# Patient Record
Sex: MALE | Race: WHITE | HISPANIC OR LATINO | Employment: UNEMPLOYED | URBAN - METROPOLITAN AREA
[De-identification: names, ages, dates, MRNs, and addresses within clinical notes are randomized per-mention and may not be internally consistent; named-entity substitution may affect disease eponyms.]

---

## 2021-07-27 ENCOUNTER — OFFICE VISIT (OUTPATIENT)
Dept: URGENT CARE | Facility: PHYSICIAN GROUP | Age: 51
End: 2021-07-27

## 2021-07-27 VITALS
SYSTOLIC BLOOD PRESSURE: 110 MMHG | BODY MASS INDEX: 23.37 KG/M2 | RESPIRATION RATE: 12 BRPM | TEMPERATURE: 99 F | OXYGEN SATURATION: 94 % | WEIGHT: 163.2 LBS | DIASTOLIC BLOOD PRESSURE: 68 MMHG | HEIGHT: 70 IN | HEART RATE: 87 BPM

## 2021-07-27 DIAGNOSIS — H53.9 VISION CHANGES: ICD-10-CM

## 2021-07-27 DIAGNOSIS — H10.11 ACUTE ATOPIC CONJUNCTIVITIS OF RIGHT EYE: ICD-10-CM

## 2021-07-27 DIAGNOSIS — H53.141 PHOTOPHOBIA OF RIGHT EYE: ICD-10-CM

## 2021-07-27 DIAGNOSIS — H57.89 RED EYE: ICD-10-CM

## 2021-07-27 PROCEDURE — 99204 OFFICE O/P NEW MOD 45 MIN: CPT | Performed by: NURSE PRACTITIONER

## 2021-07-27 RX ORDER — POLYMYXIN B SULFATE AND TRIMETHOPRIM 1; 10000 MG/ML; [USP'U]/ML
1 SOLUTION OPHTHALMIC EVERY 4 HOURS
Qty: 10 ML | Refills: 0 | Status: SHIPPED | OUTPATIENT
Start: 2021-07-27

## 2021-07-27 ASSESSMENT — ENCOUNTER SYMPTOMS
EYE REDNESS: 1
PHOTOPHOBIA: 1
EYE ITCHING: 1
BLURRED VISION: 1

## 2021-07-28 NOTE — PROGRESS NOTES
"Subjective:   Sundar Schulz is a 51 y.o. male who presents for Eye Problem (R eye redness, painful, blurry vision, light sensitivity, x3 days )       Eye Problem   The right eye is affected. This is a new problem. The current episode started in the past 7 days (4 days). The problem occurs constantly. The problem has been unchanged. There was no injury mechanism. The pain is moderate. There is no known exposure to pink eye. He does not wear contacts. Associated symptoms include blurred vision, eye redness, itching and photophobia.     Pt presents for evaluation of a new problem, reports a 4 to 6-week history of right eye irritation, and changes in vision.  Patient states with past 3 days, his right eye has become more red, dry, and feeling irritated.  Patient is also noticed increased photophobia.  Patient denies trauma, injury, or history of in vision deficits.    This visit was completed with the assistance of a .      Review of Systems   Constitutional: Negative.    HENT: Negative.    Eyes: Positive for blurred vision, photophobia, redness and itching.   Respiratory: Negative.    Cardiovascular: Negative.    Gastrointestinal: Negative.    Genitourinary: Negative.    Musculoskeletal: Negative.    Skin: Negative.    Neurological: Negative.    Psychiatric/Behavioral: Negative.    All other systems reviewed and are negative.      MEDS: No current outpatient medications on file.  ALLERGIES: No Known Allergies    Patient's PMH, SocHx, SurgHx, FamHx, Drug allergies and medications were reviewed.     Objective:   /68 (BP Location: Right arm, Patient Position: Sitting, BP Cuff Size: Adult)   Pulse 87   Temp 37.2 °C (99 °F) (Temporal)   Resp 12   Ht 1.78 m (5' 10.08\")   Wt 74 kg (163 lb 3.2 oz)   SpO2 94%   BMI 23.36 kg/m²     Physical Exam  Vitals and nursing note reviewed.   Constitutional:       General: He is awake.      Appearance: Normal appearance. He is well-developed.   HENT:      " Head: Normocephalic and atraumatic.      Right Ear: External ear normal.      Left Ear: External ear normal.      Nose: Nose normal.      Mouth/Throat:      Lips: Pink.      Mouth: Mucous membranes are moist.      Pharynx: Oropharynx is clear.   Eyes:      General: Lids are normal.      Extraocular Movements: Extraocular movements intact.      Conjunctiva/sclera:      Right eye: Right conjunctiva is injected. No exudate.     Pupils: Pupils are equal, round, and reactive to light.      Slit lamp exam:     Right eye: Photophobia present.      Comments: Right eye 20/200  Left eye 20/30   Cardiovascular:      Rate and Rhythm: Normal rate and regular rhythm.   Pulmonary:      Effort: Pulmonary effort is normal.   Musculoskeletal:         General: Normal range of motion.      Cervical back: Normal range of motion.   Skin:     General: Skin is warm and dry.   Neurological:      Mental Status: He is alert and oriented to person, place, and time.   Psychiatric:         Mood and Affect: Mood normal.         Behavior: Behavior normal. Behavior is cooperative.         Thought Content: Thought content normal.         Judgment: Judgment normal.         Assessment/Plan:   Assessment    1. Acute atopic conjunctivitis of right eye  - polymixin-trimethoprim (POLYTRIM) 55680-6.1 UNIT/ML-% Solution; Administer 1 Drop into both eyes every 4 hours.  Dispense: 10 mL; Refill: 0  - REFERRAL TO OPHTHALMOLOGY    2. Vision changes  - REFERRAL TO OPHTHALMOLOGY    3. Red eye  - REFERRAL TO OPHTHALMOLOGY    4. Photophobia of right eye  - REFERRAL TO OPHTHALMOLOGY    Vital signs stable at today's acute urgent care visit.  Begin medications as listed. Discussed my concerns regarding his significant right eye vision loss over the past 1.5 months.  Patient prefers to not go to ED at this time, but will follow up with optometry tomorrow morning. Discussed management options (risks, benefits, and alternatives to treatment).     Advised the patient to  follow-up with the primary care provider for recheck, reevaluation, and/or consideration of further management if necessary. Red flags discussed and indications to immediately call 911 or present to the ED.  All questions were encouraged and answered to the patient's satisfaction and understanding, and they agree to the plan of care.     I personally reviewed prior external notes and test results pertinent to today's visit.  I have independently reviewed and interpreted all diagnostics ordered during this urgent care acute visit. Time spent evaluating this patient was a minimum of 30 minutes and includes preparing for visit, counseling/education, exam, evaluation, obtaining history, and ordering lab/test/procedures.      Please note that this dictation was created using voice recognition software. I have made a reasonable attempt to correct obvious errors, but I expect that there are errors of grammar and possibly content that I did not discover before finalizing the note.

## 2021-07-29 ASSESSMENT — ENCOUNTER SYMPTOMS
RESPIRATORY NEGATIVE: 1
CARDIOVASCULAR NEGATIVE: 1
PSYCHIATRIC NEGATIVE: 1
CONSTITUTIONAL NEGATIVE: 1
MUSCULOSKELETAL NEGATIVE: 1
GASTROINTESTINAL NEGATIVE: 1
NEUROLOGICAL NEGATIVE: 1